# Patient Record
Sex: FEMALE | Race: WHITE | NOT HISPANIC OR LATINO | Employment: OTHER | URBAN - METROPOLITAN AREA
[De-identification: names, ages, dates, MRNs, and addresses within clinical notes are randomized per-mention and may not be internally consistent; named-entity substitution may affect disease eponyms.]

---

## 2018-11-21 ENCOUNTER — OFFICE VISIT CONVERTED (OUTPATIENT)
Dept: UROLOGY | Facility: CLINIC | Age: 58
End: 2018-11-21
Attending: UROLOGY

## 2018-11-21 ENCOUNTER — CONVERSION ENCOUNTER (OUTPATIENT)
Dept: SURGERY | Facility: CLINIC | Age: 58
End: 2018-11-21

## 2021-05-16 VITALS
BODY MASS INDEX: 32.62 KG/M2 | TEMPERATURE: 98 F | SYSTOLIC BLOOD PRESSURE: 132 MMHG | WEIGHT: 203 LBS | DIASTOLIC BLOOD PRESSURE: 80 MMHG | HEIGHT: 66 IN

## 2022-05-12 ENCOUNTER — OFFICE VISIT (OUTPATIENT)
Dept: OTOLARYNGOLOGY | Facility: CLINIC | Age: 62
End: 2022-05-12

## 2022-05-12 VITALS — WEIGHT: 207 LBS | BODY MASS INDEX: 33.27 KG/M2 | HEIGHT: 66 IN | TEMPERATURE: 97.2 F

## 2022-05-12 DIAGNOSIS — E07.9 THYROID MASS: ICD-10-CM

## 2022-05-12 DIAGNOSIS — H61.21 IMPACTED CERUMEN OF RIGHT EAR: Primary | ICD-10-CM

## 2022-05-12 PROCEDURE — 99204 OFFICE O/P NEW MOD 45 MIN: CPT | Performed by: OTOLARYNGOLOGY

## 2022-05-12 RX ORDER — ATORVASTATIN CALCIUM 20 MG/1
TABLET, FILM COATED ORAL
COMMUNITY
Start: 2022-02-07

## 2022-05-12 RX ORDER — METOPROLOL SUCCINATE 50 MG/1
TABLET, EXTENDED RELEASE ORAL
COMMUNITY
Start: 2022-03-23

## 2022-05-12 RX ORDER — CEPHALEXIN 250 MG/1
CAPSULE ORAL
COMMUNITY
Start: 2022-02-24

## 2022-05-12 RX ORDER — ESTERIFIED ESTROGENS, METHYLTESTOSTERONE 1.25; 2.5 MG/1; MG/1
TABLET ORAL
COMMUNITY
Start: 2022-04-29

## 2022-05-12 RX ORDER — PRAMIPEXOLE DIHYDROCHLORIDE 0.5 MG/1
TABLET ORAL
COMMUNITY
Start: 2022-05-05

## 2022-05-12 RX ORDER — DOXYCYCLINE HYCLATE 100 MG/1
CAPSULE ORAL
COMMUNITY
Start: 2022-04-07

## 2022-05-12 RX ORDER — FLUOXETINE 10 MG/1
CAPSULE ORAL
COMMUNITY
Start: 2022-04-08

## 2022-05-12 RX ORDER — LEVOTHYROXINE SODIUM 0.15 MG/1
TABLET ORAL
COMMUNITY
Start: 2022-03-25

## 2022-05-12 RX ORDER — PANTOPRAZOLE SODIUM 40 MG/1
TABLET, DELAYED RELEASE ORAL
COMMUNITY
Start: 2022-05-03

## 2022-05-12 RX ORDER — HYDROCODONE BITARTRATE AND ACETAMINOPHEN 10; 325 MG/1; MG/1
TABLET ORAL
COMMUNITY
Start: 2022-05-07

## 2022-05-12 RX ORDER — BUPROPION HYDROCHLORIDE 150 MG/1
TABLET, EXTENDED RELEASE ORAL
COMMUNITY

## 2022-05-12 RX ORDER — GABAPENTIN 300 MG/1
CAPSULE ORAL
COMMUNITY
Start: 2022-05-03

## 2022-05-12 RX ORDER — FLUCONAZOLE 200 MG/1
TABLET ORAL
COMMUNITY
Start: 2022-04-13

## 2022-05-12 RX ORDER — BACLOFEN 10 MG/1
TABLET ORAL
COMMUNITY

## 2022-05-12 RX ORDER — BUPROPION HYDROCHLORIDE 150 MG/1
TABLET, EXTENDED RELEASE ORAL
COMMUNITY
Start: 2022-04-04

## 2022-05-12 RX ORDER — MELOXICAM 7.5 MG/1
TABLET ORAL
COMMUNITY
Start: 2022-04-15

## 2022-05-12 NOTE — PROGRESS NOTES
Patient Name: Sheyla Arreola   Visit Date: 05/12/2022   Patient ID: [id number]  Provider: Kalen Meng MD    Sex: female  Location: [facility]   YOB: 1960  Location Address: [address] [place] [address], [address],?[place]?[address]    Primary Care Provider Panchito Burk MD  Location Phone: [phone]    Referring Provider: Donald Dawkins MD        Chief Complaint  nontoxic goiter (New patient )    Subjective    History of Present Illness  Sheyla Arreola is a 62 y.o. female who presents to [facility] today as a consult from Donald Dawkins MD.    She presents to the clinic today for evaluation of incidentally found heterogeneously enlarged left thyroid lobe.  She was recently evaluated by a surgeon at an outside hospital and his records were sent for my review.  She did have imaging recently, but unfortunately this was done in a separate system and I did not have access to the imaging.  Per the surgeons notes in 2014 she did have a CT scan of her neck which demonstrated a normal-appearing thyroid.  More recent studies show that the left thyroid lobe is enlarged and invades the mediastinum and is adjacent to the subclavian vessels and does deviate the trachea.  She did have a small 8 mm nodule.  Biopsies were obtained and these showed atypia of undetermined significance.  No diagnostic neoplastic features were present.  It was the surgeon's recommendation to have her sent to an otolaryngologist and possibly CT surgeon given the size and extent under the manubrium.  The patient denies any significant issues with this.     Past Medical History:   Diagnosis Date   • Acid reflux    • Arthritis    • COPD (chronic obstructive pulmonary disease) (HCC)    • GERD (gastroesophageal reflux disease)    • HTN (hypertension)    • Thyroid goiter        Past Surgical History:   Procedure Laterality Date   • HYSTERECTOMY     • KIDNEY STONE  "SURGERY     • ROTATOR CUFF REPAIR           Current Outpatient Medications:   •  atorvastatin (LIPITOR) 20 MG tablet, , Disp: , Rfl:   •  baclofen (LIORESAL) 10 MG tablet, baclofen 10 mg tablet, Disp: , Rfl:   •  buPROPion SR (WELLBUTRIN SR) 150 MG 12 hr tablet, , Disp: , Rfl:   •  Est Estrogens-Methyltest DS 1.25-2.5 MG tablet, , Disp: , Rfl:   •  FLUoxetine (PROzac) 10 MG capsule, , Disp: , Rfl:   •  gabapentin (NEURONTIN) 300 MG capsule, , Disp: , Rfl:   •  HYDROcodone-acetaminophen (NORCO)  MG per tablet, , Disp: , Rfl:   •  levothyroxine (SYNTHROID, LEVOTHROID) 150 MCG tablet, , Disp: , Rfl:   •  meloxicam (MOBIC) 7.5 MG tablet, , Disp: , Rfl:   •  metoprolol succinate XL (TOPROL-XL) 50 MG 24 hr tablet, , Disp: , Rfl:   •  pantoprazole (PROTONIX) 40 MG EC tablet, , Disp: , Rfl:   •  pramipexole (MIRAPEX) 0.5 MG tablet, , Disp: , Rfl:   •  buPROPion SR (WELLBUTRIN SR) 150 MG 12 hr tablet, bupropion HCl  mg tablet,12 hr sustained-release, Disp: , Rfl:   •  cephalexin (KEFLEX) 250 MG capsule, , Disp: , Rfl:   •  doxycycline (VIBRAMYCIN) 100 MG capsule, , Disp: , Rfl:   •  fluconazole (DIFLUCAN) 200 MG tablet, , Disp: , Rfl:      Allergies   Allergen Reactions   • Tramadol Other (See Comments)     Sleepy        Family History   Problem Relation Age of Onset   • Hypertension Mother    • Arthritis Mother    • Skin cancer Mother    • Hypertension Father    • Diabetes Father    • Arthritis Father    • Asthma Sister         Social History     Social History Narrative   • Not on file       Objective     Vital Signs:   Temp 97.2 °F (36.2 °C) (Tympanic)   Ht 167.6 cm (66\")   Wt 93.9 kg (207 lb)   BMI 33.41 kg/m²       Physical Exam    Ear Cerumen Removal    Date/Time: 5/17/2022 1:05 PM  Performed by: Kalen Meng MD  Authorized by: Kalen Meng MD     Anesthesia:  Local Anesthetic: none  Location details: right ear  Procedure type: instrumentation, curette   Sedation:  Patient sedated: " no            Constitutional   Appearance  · : well developed, well-nourished, alert and in no acute distress, voice clear and strong    Head  Inspection  · : no deformities or lesions  Face  Inspection  · : No facial lesions; House-Brackmann I/VI bilaterally  Palpation  · : No TMJ crepitus nor  muscle tenderness bilaterally    Eyes  Vision  Visual Fields  · : Extraocular movements are intact. No spontaneous or gaze-induced nystagmus.  Conjunctivae  · : clear  Sclerae  · : clear  Pupils and Irises  · : pupils equal, round, and reactive to light.     Ears, Nose, Mouth and Throat    Ears    External Ears  · : appearance within normal limits, no lesions present  Otoscopic Examination  · : Tympanic membrane appearance within normal limits bilaterally without perforations, well-aerated middle ears  Hearing  · : intact to conversational voice both ears  Tunning fork testing:     :    Nose    External Nose  · : appearance normal  Intranasal Exam  · : mucosa within normal limits, vestibules normal, no intranasal lesions present, septum midline, sinuses non tender to percussion  Oral Cavity    Oral Mucosa  · : oral mucosa normal without pallor or cyanosis  Lips  · : lip appearance normal  Teeth  · : normal dentition for age  Gums  · : gums pink, non-swollen, no bleeding present  Tongue  · : tongue appearance normal; normal mobility  Palate  · : hard palate normal, soft palate appearance normal with symmetric mobility    Throat    Oropharynx  · : no inflammation or lesions present, tonsils within normal limits  Hypopharynx  · : appearance within normal limits, superior epiglottis within normal limits  Larynx  · : appearance within normal limits, vocal cords within normal limits, no lesions present    Neck  Inspection/Palpation  · : Grossly enlarged hard right thyroid lobe    Respiratory  Respiratory Effort  · : breathing unlabored  Inspection of Chest  · : normal appearance, no  retractions    Cardiovascular  Heart  · : regular rate and rhythm    Lymphatic  Neck  · : no lymphadenopathy present  Supraclavicular Nodes  · : no lymphadenopathy present  Preauricular Nodes  · : no lymphadenopathy present    Skin and Subcutaneous Tissue  General Inspection  · : Regarding face and neck - there are no rashes present, no lesions present, and no areas of discoloration    Neurologic  Cranial Nerves  · : cranial nerves II-XII are grossly intact bilaterally  Gait and Station  · : normal gait, able to stand without diffculty    Psychiatric  Judgement and Insight  · : judgment and insight intact  Mood and Affect  · : mood normal, affect appropriate          Assessment and Plan    Diagnoses and all orders for this visit:    1. Impacted cerumen of right ear (Primary)    2. Thyroid mass    Examination today revealed cerumen impaction which I was able to clear.  Exam of the neck revealed the left thyroid to be grossly enlarged and hard with no appreciated lymphadenopathy.  I discussed the findings with her, and would like to assess the imaging personally.  I've asked her to bring the recent CT scan to our facility in [place] and we will see her back in the clinic shortly to review the imaging and further management for this.    Follow Up   No follow-ups on file.  Patient was given instructions and counseling regarding her condition or for health maintenance advice. Please see specific information pulled into the AVS if appropriate.

## 2022-05-17 PROBLEM — E07.9 THYROID MASS: Status: ACTIVE | Noted: 2022-05-17

## 2022-05-17 PROCEDURE — 69210 REMOVE IMPACTED EAR WAX UNI: CPT | Performed by: OTOLARYNGOLOGY

## 2022-05-18 ENCOUNTER — OFFICE VISIT (OUTPATIENT)
Dept: OTOLARYNGOLOGY | Facility: CLINIC | Age: 62
End: 2022-05-18

## 2022-05-18 VITALS — HEIGHT: 66 IN | BODY MASS INDEX: 32.92 KG/M2 | TEMPERATURE: 97.4 F | WEIGHT: 204.8 LBS

## 2022-05-18 DIAGNOSIS — E07.9 THYROID MASS: Primary | ICD-10-CM

## 2022-05-18 PROCEDURE — 99214 OFFICE O/P EST MOD 30 MIN: CPT | Performed by: OTOLARYNGOLOGY

## 2022-05-19 NOTE — PROGRESS NOTES
Patient Name: Sheyla Arreola   Visit Date: 05/18/2022   Patient ID: [id number]  Provider: Kalen Meng MD    Sex: female  Location: [facility]   YOB: 1960  Location Address: [address] [place] [address], [address],?[place]?[address]    Primary Care Provider Panchito Burk MD  Location Phone: [phone]    Referring Provider: No ref. provider found        Chief Complaint  Follow-up (Review CT scan (patient to bring))    Subjective    History of Present Illness  Sheyla Arreola is a 62 y.o. female who presents to [facility] today as a consult from No ref. provider found.    She presents to the clinic today for follow-up regarding left thyroid mass.  I recently saw her for this but did not have the imaging available for my review, and she presents today for reevaluation and to assess the CT scan and ultrasounds.  I did review the ultrasound results which show  Unfortunately CT scan she brought in today was a CT scan of the head which did not visualize the thyroid at all.  I will plan on contacting her once the CT scan is loaded into our system as it was done at an outside hospital to determine the findings.  Another surgeons interpretation of the findings as described in the previous clinic note and was concerning for mediastinal involvement.  The patient remains asymptomatic, and has had no issues or symptoms related to her thyroid.  She informs me that she is not sure how long its been enlarged as she did not notice it.    Past Medical History:   Diagnosis Date   • Acid reflux    • Arthritis    • COPD (chronic obstructive pulmonary disease) (HCC)    • GERD (gastroesophageal reflux disease)    • HTN (hypertension)    • Thyroid goiter        Past Surgical History:   Procedure Laterality Date   • HYSTERECTOMY     • KIDNEY STONE SURGERY     • ROTATOR CUFF REPAIR           Current Outpatient Medications:   •  atorvastatin (LIPITOR) 20 MG  "tablet, , Disp: , Rfl:   •  baclofen (LIORESAL) 10 MG tablet, baclofen 10 mg tablet, Disp: , Rfl:   •  buPROPion SR (WELLBUTRIN SR) 150 MG 12 hr tablet, bupropion HCl  mg tablet,12 hr sustained-release, Disp: , Rfl:   •  cephalexin (KEFLEX) 250 MG capsule, , Disp: , Rfl:   •  doxycycline (VIBRAMYCIN) 100 MG capsule, , Disp: , Rfl:   •  Est Estrogens-Methyltest DS 1.25-2.5 MG tablet, , Disp: , Rfl:   •  fluconazole (DIFLUCAN) 200 MG tablet, , Disp: , Rfl:   •  FLUoxetine (PROzac) 10 MG capsule, , Disp: , Rfl:   •  gabapentin (NEURONTIN) 300 MG capsule, , Disp: , Rfl:   •  HYDROcodone-acetaminophen (NORCO)  MG per tablet, , Disp: , Rfl:   •  levothyroxine (SYNTHROID, LEVOTHROID) 150 MCG tablet, , Disp: , Rfl:   •  meloxicam (MOBIC) 7.5 MG tablet, , Disp: , Rfl:   •  metoprolol succinate XL (TOPROL-XL) 50 MG 24 hr tablet, , Disp: , Rfl:   •  pantoprazole (PROTONIX) 40 MG EC tablet, , Disp: , Rfl:   •  pramipexole (MIRAPEX) 0.5 MG tablet, , Disp: , Rfl:   •  buPROPion SR (WELLBUTRIN SR) 150 MG 12 hr tablet, , Disp: , Rfl:      Allergies   Allergen Reactions   • Tramadol Other (See Comments)     Sleepy        Family History   Problem Relation Age of Onset   • Hypertension Mother    • Arthritis Mother    • Skin cancer Mother    • Hypertension Father    • Diabetes Father    • Arthritis Father    • Asthma Sister         Social History     Social History Narrative   • Not on file       Objective     Vital Signs:   Temp 97.4 °F (36.3 °C) (Tympanic)   Ht 167.6 cm (66\")   Wt 92.9 kg (204 lb 12.8 oz)   BMI 33.06 kg/m²       Physical Exam         Constitutional   Appearance  · : well developed, well-nourished, alert and in no acute distress, voice clear and strong    Head  Inspection  · : no deformities or lesions  Face  Inspection  · : No facial lesions; House-Brackmann I/VI bilaterally  Palpation  · : No TMJ crepitus nor  muscle tenderness bilaterally    Eyes  Vision  Visual Fields  · : Extraocular " movements are intact. No spontaneous or gaze-induced nystagmus.  Conjunctivae  · : clear  Sclerae  · : clear  Pupils and Irises  · : pupils equal, round, and reactive to light.     Ears, Nose, Mouth and Throat    Ears    External Ears  · : appearance within normal limits, no lesions present  Otoscopic Examination  · : Tympanic membrane appearance within normal limits bilaterally without perforations, well-aerated middle ears  Hearing  · : intact to conversational voice both ears  Tunning fork testing:     :    Nose    External Nose  · : appearance normal  Intranasal Exam  · : mucosa within normal limits, vestibules normal, no intranasal lesions present, septum midline, sinuses non tender to percussion  Oral Cavity    Oral Mucosa  · : oral mucosa normal without pallor or cyanosis  Lips  · : lip appearance normal  Teeth  · : normal dentition for age  Gums  · : gums pink, non-swollen, no bleeding present  Tongue  · : tongue appearance normal; normal mobility  Palate  · : hard palate normal, soft palate appearance normal with symmetric mobility    Throat    Oropharynx  · : no inflammation or lesions present, tonsils within normal limits  Hypopharynx  · : appearance within normal limits, superior epiglottis within normal limits  Larynx  · : appearance within normal limits, vocal cords within normal limits, no lesions present    Neck  Inspection/Palpation  · : normal appearance, no masses or tenderness, trachea midline; thyroid significantly enlarged on the left side with a hard mass that extends below the clavicle    Respiratory  Respiratory Effort  · : breathing unlabored  Inspection of Chest  · : normal appearance, no retractions    Cardiovascular  Heart  · : regular rate and rhythm    Lymphatic  Neck  · : no lymphadenopathy present  Supraclavicular Nodes  · : no lymphadenopathy present  Preauricular Nodes  · : no lymphadenopathy present    Skin and Subcutaneous Tissue  General Inspection  · : Regarding face and neck -  there are no rashes present, no lesions present, and no areas of discoloration    Neurologic  Cranial Nerves  · : cranial nerves II-XII are grossly intact bilaterally  Gait and Station  · : normal gait, able to stand without diffculty    Psychiatric  Judgement and Insight  · : judgment and insight intact  Mood and Affect  · : mood normal, affect appropriate          Assessment and Plan    Diagnoses and all orders for this visit:    1. Thyroid mass (Primary)    Examination reveals stable enlarged left thyroid lobe with no lymphadenopathy.  I reviewed the patient's imaging that she brought in today, but unfortunately the CT scan of the neck is a scan which we really need to assess prior to any decisions being made.  I will have her return to clinic with the imaging and we will review this at the next clinic visit.  I have asked her to contact me should she have any other issues.    Follow Up   No follow-ups on file.  Patient was given instructions and counseling regarding her condition or for health maintenance advice. Please see specific information pulled into the AVS if appropriate.

## 2022-05-23 ENCOUNTER — TELEPHONE (OUTPATIENT)
Dept: OTOLARYNGOLOGY | Facility: CLINIC | Age: 62
End: 2022-05-23

## 2022-05-23 NOTE — TELEPHONE ENCOUNTER
Provider: DR. WALKER  Caller: CHRIS DOYLE  Relationship to Patient: SELF    Phone Number: [phone]  Reason for Call: PT CALLED AS SHE'LL BE ABLE TO  IMAGING DISC FROM [facility] AS OF TOMORROW. WANTED DR. WALKER'S TEAM TO CALL SO SHE CAN GET SCHEDULED WITH HIM IN [place] IF POSSIBLE.

## 2022-05-25 ENCOUNTER — OFFICE VISIT (OUTPATIENT)
Dept: OTOLARYNGOLOGY | Facility: CLINIC | Age: 62
End: 2022-05-25

## 2022-05-25 VITALS — HEIGHT: 66 IN | TEMPERATURE: 98 F | WEIGHT: 204.4 LBS | BODY MASS INDEX: 32.85 KG/M2

## 2022-05-25 DIAGNOSIS — E07.9 THYROID MASS: ICD-10-CM

## 2022-05-25 DIAGNOSIS — E04.9 THYROID ENLARGEMENT: Primary | ICD-10-CM

## 2022-05-25 PROCEDURE — 99214 OFFICE O/P EST MOD 30 MIN: CPT | Performed by: OTOLARYNGOLOGY

## 2022-05-25 NOTE — PROGRESS NOTES
Patient Name: Sheyla Arreola   Visit Date: 05/25/2022   Patient ID: [id number]  Provider: Kalen Meng MD    Sex: female  Location: [facility]   YOB: 1960  Location Address: [address] [place] [address], [address],?[place]?[address]    Primary Care Provider Panchito Burk MD  Location Phone: [phone]    Referring Provider: No ref. provider found        Chief Complaint  Thyroid Problem (LEFT THYROID MASS)    Subjective    History of Present Illness  Sheyla Arreola is a 62 y.o. female who presents to [facility] today as a consult from No ref. provider found.    She presents to the clinic today for follow-up regarding enlarged left thyroid lobe with substernal extension.  She has had no change in symptoms, and really has had no symptoms from the thyroid enlargement.  Biopsy showed atypia of undetermined significance.  She did bring in a CT scan which reveals a very large left thyroid lobe with extension posteriorly to the cervical spine, superiorly to the hyoid bone, inferiorly down to the area just above the aortic arch and innominate artery.  There is displacement of the larynx and trachea towards the right.     Past Medical History:   Diagnosis Date   • Acid reflux    • Arthritis    • COPD (chronic obstructive pulmonary disease) (HCC)    • GERD (gastroesophageal reflux disease)    • HTN (hypertension)    • Thyroid goiter        Past Surgical History:   Procedure Laterality Date   • HYSTERECTOMY     • KIDNEY STONE SURGERY     • ROTATOR CUFF REPAIR           Current Outpatient Medications:   •  atorvastatin (LIPITOR) 20 MG tablet, , Disp: , Rfl:   •  baclofen (LIORESAL) 10 MG tablet, baclofen 10 mg tablet, Disp: , Rfl:   •  buPROPion SR (WELLBUTRIN SR) 150 MG 12 hr tablet, , Disp: , Rfl:   •  buPROPion SR (WELLBUTRIN SR) 150 MG 12 hr tablet, bupropion HCl  mg tablet,12 hr sustained-release, Disp: , Rfl:   •  Est  "Estrogens-Methyltest DS 1.25-2.5 MG tablet, , Disp: , Rfl:   •  fluconazole (DIFLUCAN) 200 MG tablet, , Disp: , Rfl:   •  FLUoxetine (PROzac) 10 MG capsule, , Disp: , Rfl:   •  gabapentin (NEURONTIN) 300 MG capsule, , Disp: , Rfl:   •  HYDROcodone-acetaminophen (NORCO)  MG per tablet, , Disp: , Rfl:   •  levothyroxine (SYNTHROID, LEVOTHROID) 150 MCG tablet, , Disp: , Rfl:   •  meloxicam (MOBIC) 7.5 MG tablet, , Disp: , Rfl:   •  metoprolol succinate XL (TOPROL-XL) 50 MG 24 hr tablet, , Disp: , Rfl:   •  pantoprazole (PROTONIX) 40 MG EC tablet, , Disp: , Rfl:   •  pramipexole (MIRAPEX) 0.5 MG tablet, , Disp: , Rfl:   •  cephalexin (KEFLEX) 250 MG capsule, , Disp: , Rfl:   •  doxycycline (VIBRAMYCIN) 100 MG capsule, , Disp: , Rfl:      Allergies   Allergen Reactions   • Tramadol Other (See Comments)     Sleepy        Family History   Problem Relation Age of Onset   • Hypertension Mother    • Arthritis Mother    • Skin cancer Mother    • Hypertension Father    • Diabetes Father    • Arthritis Father    • Asthma Sister         Social History     Social History Narrative   • Not on file       Objective     Vital Signs:   Temp 98 °F (36.7 °C)   Ht 167.6 cm (66\")   Wt 92.7 kg (204 lb 6.4 oz)   BMI 32.99 kg/m²       Physical Exam         Constitutional   Appearance  · : well developed, well-nourished, alert and in no acute distress, voice clear and strong    Head  Inspection  · : no deformities or lesions  Face  Inspection  · : No facial lesions; House-Brackmann I/VI bilaterally  Palpation  · : No TMJ crepitus nor  muscle tenderness bilaterally    Eyes  Vision  Visual Fields  · : Extraocular movements are intact. No spontaneous or gaze-induced nystagmus.  Conjunctivae  · : clear  Sclerae  · : clear  Pupils and Irises  · : pupils equal, round, and reactive to light.     Ears, Nose, Mouth and Throat    Ears    External Ears  · : appearance within normal limits, no lesions present  Otoscopic Examination  · : " Tympanic membrane appearance within normal limits bilaterally without perforations, well-aerated middle ears  Hearing  · : intact to conversational voice both ears  Tunning fork testing:     :    Nose    External Nose  · : appearance normal  Intranasal Exam  · : mucosa within normal limits, vestibules normal, no intranasal lesions present, septum midline, sinuses non tender to percussion  Oral Cavity    Oral Mucosa  · : oral mucosa normal without pallor or cyanosis  Lips  · : lip appearance normal  Teeth  · : normal dentition for age  Gums  · : gums pink, non-swollen, no bleeding present  Tongue  · : tongue appearance normal; normal mobility  Palate  · : hard palate normal, soft palate appearance normal with symmetric mobility    Throat    Oropharynx  · : no inflammation or lesions present, tonsils within normal limits  Hypopharynx  · : appearance within normal limits, superior epiglottis within normal limits  Larynx  · : appearance within normal limits, vocal cords within normal limits, no lesions present    Neck  Inspection/Palpation  · : normal appearance, no masses or tenderness, trachea midline; thyroid enlarged on the left with hard mass, no lymphadenopathy, no tenderness on palpation, mass extends underneath the clavicle    Respiratory  Respiratory Effort  · : breathing unlabored  Inspection of Chest  · : normal appearance, no retractions    Cardiovascular  Heart  · : regular rate and rhythm    Lymphatic  Neck  · : no lymphadenopathy present  Supraclavicular Nodes  · : no lymphadenopathy present  Preauricular Nodes  · : no lymphadenopathy present    Skin and Subcutaneous Tissue  General Inspection  · : Regarding face and neck - there are no rashes present, no lesions present, and no areas of discoloration    Neurologic  Cranial Nerves  · : cranial nerves II-XII are grossly intact bilaterally  Gait and Station  · : normal gait, able to stand without diffculty    Psychiatric  Judgement and Insight  · : judgment  and insight intact  Mood and Affect  · : mood normal, affect appropriate          Assessment and Plan    Diagnoses and all orders for this visit:    1. Thyroid enlargement (Primary)    2. Thyroid mass    Examination today revealed stable enlarged left thyroid.  There is no lymphadenopathy.  CT scan was reviewed with the patient, and given the size of the thyroid as well as extension into the chest, it may be [facility] this [facility].  I discussed this with the patient, and we will refer her to [facility] for further evaluation.  If there are any acute changes, I will be glad to help, and she understands to contact me.    Follow Up   No follow-ups on file.  Patient was given instructions and counseling regarding her condition or for health maintenance advice. Please see specific information pulled into the AVS if appropriate.

## 2024-05-16 ENCOUNTER — HOSPITAL ENCOUNTER (OUTPATIENT)
Dept: CT IMAGING | Facility: HOSPITAL | Age: 64
Discharge: HOME | End: 2024-05-16
Admitting: NURSE PRACTITIONER
Payer: COMMERCIAL

## 2024-05-16 ENCOUNTER — TRANSCRIBE ORDERS (OUTPATIENT)
Dept: ADMINISTRATIVE | Facility: HOSPITAL | Age: 64
End: 2024-05-16
Payer: COMMERCIAL

## 2024-05-16 DIAGNOSIS — Z87.442 HISTORY OF KIDNEY STONES: Primary | ICD-10-CM

## 2024-05-16 DIAGNOSIS — Z87.442 HISTORY OF KIDNEY STONES: ICD-10-CM

## 2024-05-16 PROCEDURE — 74176 CT ABD & PELVIS W/O CONTRAST: CPT

## 2024-07-15 PROBLEM — N20.0 NEPHROLITHIASIS: Status: ACTIVE | Noted: 2024-07-15

## 2024-07-20 NOTE — PROGRESS NOTES
Chief Complaint: Urologic complaint    Subjective         History of Present Illness  Sheyla Arreola is a 64 y.o. female       Nephrolithiasis    Thyroid carcinoma        5/16/2024 CT abdomen/pelvis without - 7 nonobstructing stones right kidney, 2-5 mm, 2-3 mm, 3-2 mm.  Left kidney 6 mm, 5 mm, 4 mm nonobstructing stone.  No obstructing stones.  Images reviewed.    9/23 1.3, GFR 46       7/22 left thyroid lobectomy -under control currently per patient    Has seen Rich in the past for nephrolithiasis      3 lithotripsies, Several kidney stones.  Spontaneously.    No previous medication for kidney stone    No GH  No history of recurrent UTI.    Sister with nephrolithiasis,    No urologic family history    No CAD, COPD with emphysema  Non-smoker  No anticoagulation          Objective     Past Medical History:   Diagnosis Date    Acid reflux     Arthritis     COPD (chronic obstructive pulmonary disease)     GERD (gastroesophageal reflux disease)     HTN (hypertension)     Thyroid goiter        Past Surgical History:   Procedure Laterality Date    HYSTERECTOMY      KIDNEY STONE SURGERY      ROTATOR CUFF REPAIR               Vital Signs:   There were no vitals taken for this visit.     Physical exam    Alert and orient x3  Well appearing, well developed, in no acute distress   Unlabored respirations  Nontender/nondistended      Grossly oriented to person, place and time, judgment is intact, normal mood and affect    No results found for this or any previous visit.          Assessment and Plan    Diagnoses and all orders for this visit:    1. Nephrolithiasis (Primary)        CT imaging and read reviewed and discussed with patient      The patient was counseled on the preventative measures of kidney stones today.  This included increasing fluid intake to make at least 1.5 ml daily, decreasing meat intake, decreasing salt intake and taking in a normal amount of calcium (1000 mg daily).  Information handout given on  this today.      We also discussed the DASH diet today for stone prevention and handout was given      At this time after discussion we will not move forward with any surgery.  We did discuss ureteroscopy briefly.    Patient would like to follow her stones conservatively.  I will have her follow-up with NP in 1 year with KUB.  Because she does have several stones on each side of any stones get bigger than a centimeter I would recommend moving forward.

## 2024-07-22 ENCOUNTER — OFFICE VISIT (OUTPATIENT)
Dept: UROLOGY | Facility: CLINIC | Age: 64
End: 2024-07-22
Payer: COMMERCIAL

## 2024-07-22 VITALS — RESPIRATION RATE: 16 BRPM | BODY MASS INDEX: 28.12 KG/M2 | WEIGHT: 175 LBS | HEIGHT: 66 IN

## 2024-07-22 DIAGNOSIS — N20.0 NEPHROLITHIASIS: Primary | ICD-10-CM

## 2024-07-22 PROCEDURE — 99213 OFFICE O/P EST LOW 20 MIN: CPT | Performed by: UROLOGY
